# Patient Record
Sex: FEMALE | Race: BLACK OR AFRICAN AMERICAN
[De-identification: names, ages, dates, MRNs, and addresses within clinical notes are randomized per-mention and may not be internally consistent; named-entity substitution may affect disease eponyms.]

---

## 2022-04-19 PROBLEM — Z00.00 ENCOUNTER FOR PREVENTIVE HEALTH EXAMINATION: Status: ACTIVE | Noted: 2022-04-19

## 2023-07-07 ENCOUNTER — NON-APPOINTMENT (OUTPATIENT)
Age: 48
End: 2023-07-07

## 2023-08-07 ENCOUNTER — NON-APPOINTMENT (OUTPATIENT)
Age: 48
End: 2023-08-07

## 2023-08-09 ENCOUNTER — APPOINTMENT (OUTPATIENT)
Dept: BREAST CENTER | Facility: CLINIC | Age: 48
End: 2023-08-09
Payer: COMMERCIAL

## 2023-08-09 ENCOUNTER — NON-APPOINTMENT (OUTPATIENT)
Age: 48
End: 2023-08-09

## 2023-08-09 VITALS
WEIGHT: 142 LBS | DIASTOLIC BLOOD PRESSURE: 78 MMHG | BODY MASS INDEX: 23.66 KG/M2 | HEART RATE: 68 BPM | SYSTOLIC BLOOD PRESSURE: 119 MMHG | HEIGHT: 65 IN

## 2023-08-09 DIAGNOSIS — Z12.39 ENCOUNTER FOR OTHER SCREENING FOR MALIGNANT NEOPLASM OF BREAST: ICD-10-CM

## 2023-08-09 DIAGNOSIS — Z78.9 OTHER SPECIFIED HEALTH STATUS: ICD-10-CM

## 2023-08-09 DIAGNOSIS — Z86.39 PERSONAL HISTORY OF OTHER ENDOCRINE, NUTRITIONAL AND METABOLIC DISEASE: ICD-10-CM

## 2023-08-09 DIAGNOSIS — Z80.3 FAMILY HISTORY OF MALIGNANT NEOPLASM OF BREAST: ICD-10-CM

## 2023-08-09 DIAGNOSIS — R92.8 OTHER ABNORMAL AND INCONCLUSIVE FINDINGS ON DIAGNOSTIC IMAGING OF BREAST: ICD-10-CM

## 2023-08-09 PROCEDURE — 99203 OFFICE O/P NEW LOW 30 MIN: CPT

## 2023-08-09 NOTE — PAST MEDICAL HISTORY
[Menarche Age ____] : age at menarche was [unfilled] [Definite ___ (Date)] : the last menstrual period was [unfilled] [Total Preg ___] : G[unfilled] [History of Hormone Replacement Treatment] : has no history of hormone replacement treatment [FreeTextEntry6] : no [FreeTextEntry7] : no [FreeTextEntry8] : N/A

## 2023-08-09 NOTE — HISTORY OF PRESENT ILLNESS
[FreeTextEntry1] : Patient is a 49 yo F who presents today for breast cancer screening. She is an OLD/NEW w/ LOV 5/2017. At that time, was seen for R palpable nodule c/w cyst on imaging. S/p L US benign bx 11/2022. Referred by Dr. Patrick Patel (GYN).  Fhx of breast cancer in mother (age 57), maternal aunt (age 60), and maternal second cousin (age 58). No known genetic testing for family members. Patient declines genetic testing at this time. Patient denies palpable masses, skin changes, or nipple discharge bilaterally. Of note, patient is attempting to become pregnant. Denies fertility treatment use and HRT.  CHEMO Lifetime Risk- 28.5%  5/22/17: B/l MG & R US ([facility])- few tiny cysts were identified at the area of concern. 7/18/18: B/l MG ([facility])- heterogenously dense. CARL. Rec genetic counseling and MRI. BI-RADS 2 7/27/20: B/l MG & US ([facility]- R mass)- heterogenously dense. R CARL (area of concern). US- R 1.6cm cluster of cyst 11:00 2FN (palpable). R 0.9cm cluster of cyst 12:00 3FN. BI-RADS 2 11/4/22: B/l MG& US ([facility]- R mass)- extremely dense, R- marker overlies at site of palpable abnormality w/o MG correlate. US- R 9:30 9FN 1.3 cm probably benign hypoechoic mass c/w FA (6M f/u R US), R 10:00 2FN (palpable) c/w multiple simple cysts, R 10:30 3FN multiple simple cysts, L-2:00 3FN indistinct hypoechoic mass (rec L US bx). BIRADS 4. 11/21/22: L US bx hypoechoic mass 2:00 3FN (coil clip)- benign breast tissue w/ stromal fibrosis and portion of cyst wall. Benign & Concordant.

## 2023-08-09 NOTE — PHYSICAL EXAM
[de-identified] : Bilateral breast/axilla/supraclavicular area: No masses, discharge, or adenopathy\par

## 2023-08-28 ENCOUNTER — NON-APPOINTMENT (OUTPATIENT)
Age: 48
End: 2023-08-28

## 2024-08-01 ENCOUNTER — NON-APPOINTMENT (OUTPATIENT)
Age: 49
End: 2024-08-01

## 2024-08-02 NOTE — PHYSICAL EXAM
[Normocephalic] : normocephalic [EOMI] : extra ocular movement intact [Supple] : supple [No Supraclavicular Adenopathy] : no supraclavicular adenopathy [No Cervical Adenopathy] : no cervical adenopathy [de-identified] : Bilateral breast/axilla/supraclavicular area: No masses, discharge, or adenopathy\par

## 2024-08-02 NOTE — HISTORY OF PRESENT ILLNESS
[FreeTextEntry1] : Patient is a 50 yo F who presents today for breast cancer screening.  S/p L US benign bx 11/2022. Referred by Dr. Patrick Patel (GYN). Fhx of breast cancer in mother (age 57), maternal aunt (age 60), and maternal second cousin (age 58). No known genetic testing for family members. Patient continues to decline genetic testing at this time ???? Patient previously recommended to start high risk screening MRIs at last office visit but, did not complete ???? Patient denies palpable masses, skin changes, or nipple discharge bilaterally. Of note, patient is attempting to become pregnant???? Denies fertility treatment use and HRT.  CHEMO Lifetime Risk- 28.5%  5/22/17: B/l MG & R US ([facility])- few tiny cysts were identified at the area of concern. 7/18/18: B/l MG ([facility])- heterogenously dense. CARL. Rec genetic counseling and MRI. BI-RADS 2 7/27/20: B/l MG & US ([facility]- R mass)- heterogenously dense. R CARL (area of concern). US- R 1.6cm cluster of cyst 11:00 2FN (palpable). R 0.9cm cluster of cyst 12:00 3FN. BI-RADS 2 11/4/22: B/l MG& US ([facility]- R mass)- extremely dense, R- marker overlies at site of palpable abnormality w/o MG correlate. US- R 9:30 9FN 1.3 cm probably benign hypoechoic mass c/w FA (6M f/u R US), R 10:00 2FN (palpable) c/w multiple simple cysts, R 10:30 3FN multiple simple cysts, L-2:00 3FN indistinct hypoechoic mass (rec L US bx). BIRADS 4. 11/21/22: L US bx hypoechoic mass 2:00 3FN (coil clip)- benign breast tissue w/ stromal fibrosis and portion of cyst wall. Benign & Concordant. 8/28/23: B/l US (outside)- R 9:30 9FN 1.3 cm unchanged hypoechoic parallel mass (rec 3M f/u), L- 2:00 3FN c/w area of bx'd benign unchanged hypoechoic mass, BIRADS 3. 12/2023- B/l MG/US (outside)??

## 2024-08-02 NOTE — PAST MEDICAL HISTORY
[Menarche Age ____] : age at menarche was [unfilled] [History of Hormone Replacement Treatment] : has no history of hormone replacement treatment [Definite ___ (Date)] : the last menstrual period was [unfilled] [Total Preg ___] : G[unfilled] [FreeTextEntry6] : no [FreeTextEntry7] : no [FreeTextEntry8] : N/A